# Patient Record
Sex: FEMALE | URBAN - METROPOLITAN AREA
[De-identification: names, ages, dates, MRNs, and addresses within clinical notes are randomized per-mention and may not be internally consistent; named-entity substitution may affect disease eponyms.]

---

## 2019-03-29 ENCOUNTER — APPOINTMENT (RX ONLY)
Dept: URBAN - METROPOLITAN AREA CLINIC 31 | Facility: CLINIC | Age: 69
Setting detail: DERMATOLOGY
End: 2019-03-29

## 2019-03-29 DIAGNOSIS — L81.4 OTHER MELANIN HYPERPIGMENTATION: ICD-10-CM

## 2019-03-29 DIAGNOSIS — D22 MELANOCYTIC NEVI: ICD-10-CM

## 2019-03-29 DIAGNOSIS — D18.0 HEMANGIOMA: ICD-10-CM

## 2019-03-29 DIAGNOSIS — L81.3 CAFÉ AU LAIT SPOTS: ICD-10-CM

## 2019-03-29 PROBLEM — D22.71 MELANOCYTIC NEVI OF RIGHT LOWER LIMB, INCLUDING HIP: Status: ACTIVE | Noted: 2019-03-29

## 2019-03-29 PROBLEM — D18.01 HEMANGIOMA OF SKIN AND SUBCUTANEOUS TISSUE: Status: ACTIVE | Noted: 2019-03-29

## 2019-03-29 PROBLEM — J30.1 ALLERGIC RHINITIS DUE TO POLLEN: Status: ACTIVE | Noted: 2019-03-29

## 2019-03-29 PROBLEM — D48.5 NEOPLASM OF UNCERTAIN BEHAVIOR OF SKIN: Status: ACTIVE | Noted: 2019-03-29

## 2019-03-29 PROBLEM — D22.72 MELANOCYTIC NEVI OF LEFT LOWER LIMB, INCLUDING HIP: Status: ACTIVE | Noted: 2019-03-29

## 2019-03-29 PROCEDURE — 99214 OFFICE O/P EST MOD 30 MIN: CPT | Mod: 25

## 2019-03-29 PROCEDURE — ? COUNSELING

## 2019-03-29 PROCEDURE — ? SUNSCREEN RECOMMENDATIONS

## 2019-03-29 PROCEDURE — 11102 TANGNTL BX SKIN SINGLE LES: CPT

## 2019-03-29 PROCEDURE — ? INVENTORY

## 2019-03-29 PROCEDURE — ? BIOPSY BY SHAVE METHOD

## 2019-03-29 ASSESSMENT — LOCATION ZONE DERM
LOCATION ZONE: LEG
LOCATION ZONE: ARM
LOCATION ZONE: TRUNK

## 2019-03-29 ASSESSMENT — LOCATION SIMPLE DESCRIPTION DERM
LOCATION SIMPLE: LEFT THIGH
LOCATION SIMPLE: RIGHT THIGH
LOCATION SIMPLE: LEFT UPPER ARM
LOCATION SIMPLE: RIGHT BUTTOCK
LOCATION SIMPLE: RIGHT SHOULDER

## 2019-03-29 ASSESSMENT — LOCATION DETAILED DESCRIPTION DERM
LOCATION DETAILED: RIGHT ANTERIOR SHOULDER
LOCATION DETAILED: RIGHT BUTTOCK
LOCATION DETAILED: LEFT ANTERIOR PROXIMAL UPPER ARM
LOCATION DETAILED: LEFT ANTERIOR PROXIMAL THIGH
LOCATION DETAILED: RIGHT ANTERIOR PROXIMAL THIGH

## 2019-03-29 NOTE — PROCEDURE: BIOPSY BY SHAVE METHOD
Bill For Surgical Tray: no
Notification Instructions: Patient will be notified of biopsy results. However, patient instructed to call the office if not contacted within 2 weeks.
Detail Level: Detailed
Consent: Written consent was obtained and risks were reviewed including but not limited to scarring, infection, bleeding, scabbing, incomplete removal, nerve damage and allergy to anesthesia.
Was A Bandage Applied: Yes
Lab: -20
Biopsy Method: Dermablade
Curettage Text: The wound bed was treated with curettage after the biopsy was performed.
Additional Anesthesia Volume In Cc (Will Not Render If 0): 0
Cryotherapy Text: The wound bed was treated with cryotherapy after the biopsy was performed.
Anesthesia Type: 1% lidocaine with epinephrine
Lab Facility: 3
Wound Care: Vaseline
Biopsy Type: H and E
Electrodesiccation Text: The wound bed was treated with electrodesiccation after the biopsy was performed.
Anesthesia Volume In Cc (Will Not Render If 0): 0.5
Type Of Destruction Used: Curettage
Billing Type: Third-Party Bill
Depth Of Biopsy: dermis
Post-Care Instructions: I reviewed with the patient in detail post-care instructions. Patient is to keep the biopsy site dry overnight, and then apply bacitracin twice daily until healed. Patient may apply hydrogen peroxide soaks to remove any crusting.
Electrodesiccation And Curettage Text: The wound bed was treated with electrodesiccation and curettage after the biopsy was performed.
Hemostasis: Aluminum Chloride
Silver Nitrate Text: The wound bed was treated with silver nitrate after the biopsy was performed.
Dressing: bandage

## 2019-04-24 ENCOUNTER — APPOINTMENT (RX ONLY)
Dept: URBAN - METROPOLITAN AREA CLINIC 26 | Facility: CLINIC | Age: 69
Setting detail: DERMATOLOGY
End: 2019-04-24

## 2019-04-24 VITALS — SYSTOLIC BLOOD PRESSURE: 128 MMHG | HEART RATE: 70 BPM | DIASTOLIC BLOOD PRESSURE: 67 MMHG

## 2019-04-24 PROBLEM — D03.61 MELANOMA IN SITU OF RIGHT UPPER LIMB, INCLUDING SHOULDER: Status: ACTIVE | Noted: 2019-04-24

## 2019-04-24 PROCEDURE — ? EXCISION

## 2019-04-24 PROCEDURE — 13121 CMPLX RPR S/A/L 2.6-7.5 CM: CPT

## 2019-04-24 PROCEDURE — 11602 EXC TR-EXT MAL+MARG 1.1-2 CM: CPT

## 2019-05-14 ENCOUNTER — APPOINTMENT (RX ONLY)
Dept: URBAN - METROPOLITAN AREA CLINIC 26 | Facility: CLINIC | Age: 69
Setting detail: DERMATOLOGY
End: 2019-05-14

## 2019-05-14 DIAGNOSIS — Z86.006 PERSONAL HISTORY OF MELANOMA IN-SITU: ICD-10-CM | Status: RESOLVED

## 2019-05-14 DIAGNOSIS — Z18.39 OTHER RETAINED ORGANIC FRAGMENTS: ICD-10-CM

## 2019-05-14 PROBLEM — Z85.820 PERSONAL HISTORY OF MALIGNANT MELANOMA OF SKIN: Status: ACTIVE | Noted: 2019-05-14

## 2019-05-14 PROCEDURE — 99213 OFFICE O/P EST LOW 20 MIN: CPT

## 2019-05-14 PROCEDURE — ? COUNSELING

## 2019-05-14 PROCEDURE — ? TICK REMOVAL

## 2019-05-14 PROCEDURE — ? ORDER TESTS

## 2019-05-14 ASSESSMENT — LOCATION ZONE DERM
LOCATION ZONE: ARM
LOCATION ZONE: VULVA

## 2019-05-14 ASSESSMENT — LOCATION SIMPLE DESCRIPTION DERM
LOCATION SIMPLE: GROIN
LOCATION SIMPLE: RIGHT SHOULDER

## 2019-05-14 ASSESSMENT — LOCATION DETAILED DESCRIPTION DERM
LOCATION DETAILED: RIGHT ANTERIOR SHOULDER
LOCATION DETAILED: MONS PUBIS

## 2019-05-14 NOTE — PROCEDURE: TICK REMOVAL
Foceps Text (A Tick Located On (Location)....): was removed with forceps. The tick was grasped with the forceps as close to the skin as possible and removed with gentle upward pressure.
Lidocaine Text (A Tick Located On (Location)....): was removed with lidocaine infiltration. After the lidocaine was infiltrated the tick crawled out of the skin on its own.
Detail Level: Detailed
Incision 19980 Text (A Tick Located On (Location)....): was removed utilizing an incision created by a 15-blade scalpel. This was complicated by the difficulty of visualizing and removing all of the tick parts.
Anesthesia Volume In Cc: 0.5
Curette Text (A Tick Located On (Location)....): was removed with a curette. The skin immediately surrounding the tick was removed with a curette.
Removal Method (Required For Billing): Forceps (no billing)
Incision 58146 Text (A Tick Located On (Location)....): was removed utilizing an incision created by a 15-blade scalpel.

## 2019-05-14 NOTE — HPI: SKIN LESION
What Type Of Note Output Would You Prefer (Optional)?: Bullet Format
How Severe Is Your Skin Lesion?: mild
Has Your Skin Lesion Been Treated?: not been treated
Is This A New Presentation, Or A Follow-Up?: Mole
Which Family Member (Optional)?: Cousin
Which Family Member (Optional)?: Father

## 2019-10-21 ENCOUNTER — APPOINTMENT (RX ONLY)
Dept: URBAN - METROPOLITAN AREA CLINIC 31 | Facility: CLINIC | Age: 69
Setting detail: DERMATOLOGY
End: 2019-10-21

## 2019-10-21 DIAGNOSIS — L81.4 OTHER MELANIN HYPERPIGMENTATION: ICD-10-CM

## 2019-10-21 DIAGNOSIS — D22 MELANOCYTIC NEVI: ICD-10-CM

## 2019-10-21 DIAGNOSIS — L57.0 ACTINIC KERATOSIS: ICD-10-CM

## 2019-10-21 DIAGNOSIS — Z86.006 PERSONAL HISTORY OF MELANOMA IN-SITU: ICD-10-CM | Status: RESOLVED

## 2019-10-21 DIAGNOSIS — L82.1 OTHER SEBORRHEIC KERATOSIS: ICD-10-CM

## 2019-10-21 DIAGNOSIS — L81.3 CAFÉ AU LAIT SPOTS: ICD-10-CM

## 2019-10-21 DIAGNOSIS — D18.0 HEMANGIOMA: ICD-10-CM

## 2019-10-21 PROBLEM — D18.01 HEMANGIOMA OF SKIN AND SUBCUTANEOUS TISSUE: Status: ACTIVE | Noted: 2019-10-21

## 2019-10-21 PROBLEM — D22.71 MELANOCYTIC NEVI OF RIGHT LOWER LIMB, INCLUDING HIP: Status: ACTIVE | Noted: 2019-10-21

## 2019-10-21 PROBLEM — Z85.820 PERSONAL HISTORY OF MALIGNANT MELANOMA OF SKIN: Status: ACTIVE | Noted: 2019-10-21

## 2019-10-21 PROBLEM — D22.72 MELANOCYTIC NEVI OF LEFT LOWER LIMB, INCLUDING HIP: Status: ACTIVE | Noted: 2019-10-21

## 2019-10-21 PROCEDURE — 99214 OFFICE O/P EST MOD 30 MIN: CPT | Mod: 25

## 2019-10-21 PROCEDURE — ? LIQUID NITROGEN

## 2019-10-21 PROCEDURE — ? FULL BODY SKIN EXAM

## 2019-10-21 PROCEDURE — ? SUNSCREEN RECOMMENDATIONS

## 2019-10-21 PROCEDURE — 17000 DESTRUCT PREMALG LESION: CPT

## 2019-10-21 PROCEDURE — ? COUNSELING

## 2019-10-21 ASSESSMENT — LOCATION SIMPLE DESCRIPTION DERM
LOCATION SIMPLE: RIGHT FOREHEAD
LOCATION SIMPLE: LEFT LOWER BACK
LOCATION SIMPLE: RIGHT POSTERIOR THIGH
LOCATION SIMPLE: LEFT PRETIBIAL REGION
LOCATION SIMPLE: ABDOMEN
LOCATION SIMPLE: RIGHT UPPER BACK
LOCATION SIMPLE: LEFT THIGH
LOCATION SIMPLE: RIGHT BUTTOCK
LOCATION SIMPLE: LEFT UPPER BACK

## 2019-10-21 ASSESSMENT — LOCATION DETAILED DESCRIPTION DERM
LOCATION DETAILED: LEFT SUPERIOR UPPER BACK
LOCATION DETAILED: LEFT INFERIOR MEDIAL MIDBACK
LOCATION DETAILED: LEFT PROXIMAL PRETIBIAL REGION
LOCATION DETAILED: RIGHT MEDIAL UPPER BACK
LOCATION DETAILED: LEFT LATERAL ABDOMEN
LOCATION DETAILED: RIGHT LATERAL FOREHEAD
LOCATION DETAILED: LEFT ANTERIOR PROXIMAL THIGH
LOCATION DETAILED: RIGHT DISTAL POSTERIOR THIGH
LOCATION DETAILED: RIGHT BUTTOCK
LOCATION DETAILED: LEFT RIB CAGE

## 2019-10-21 ASSESSMENT — LOCATION ZONE DERM
LOCATION ZONE: LEG
LOCATION ZONE: TRUNK
LOCATION ZONE: FACE

## 2019-10-21 NOTE — PROCEDURE: LIQUID NITROGEN
Number Of Freeze-Thaw Cycles: 2 freeze-thaw cycles
Duration Of Freeze Thaw-Cycle (Seconds): 0
Post-Care Instructions: I reviewed with the patient in detail post-care instructions. Patient is to wear sunprotection, and avoid picking at any of the treated lesions. Pt may apply Vaseline to crusted or scabbing areas.
Render Note In Bullet Format When Appropriate: No
Consent: The patient's consent was obtained including but not limited to risks of crusting, scabbing, blistering, scarring, darker or lighter pigmentary change, recurrence, incomplete removal and infection.
Detail Level: Simple

## 2019-10-21 NOTE — PROCEDURE: MIPS QUALITY
Quality 226: Preventive Care And Screening: Tobacco Use: Screening And Cessation Intervention: Patient screened for tobacco use and is an ex/non-smoker
Quality 137: Melanoma: Continuity Of Care - Recall System: Patient information entered into a recall system that includes: target date for the next exam specified AND a process to follow up with patients regarding missed or unscheduled appointments
Detail Level: Detailed
Quality 431: Preventive Care And Screening: Unhealthy Alcohol Use - Screening: Patient screened for unhealthy alcohol use using a single question and scores less than 2 times per year
Quality 130: Documentation Of Current Medications In The Medical Record: Current Medications Documented

## 2019-10-21 NOTE — HPI: EVALUATION OF SKIN LESION(S)
Hpi Title: Evaluation of Skin Lesions
How Severe Are Your Spot(S)?: mild
Have Your Spot(S) Been Treated In The Past?: has not been treated
Location: Right shoulder 3/2019

## 2020-05-12 ENCOUNTER — APPOINTMENT (RX ONLY)
Dept: URBAN - METROPOLITAN AREA CLINIC 26 | Facility: CLINIC | Age: 70
Setting detail: DERMATOLOGY
End: 2020-05-12

## 2020-05-12 DIAGNOSIS — D22 MELANOCYTIC NEVI: ICD-10-CM

## 2020-05-12 DIAGNOSIS — L82.1 OTHER SEBORRHEIC KERATOSIS: ICD-10-CM

## 2020-05-12 DIAGNOSIS — D18.0 HEMANGIOMA: ICD-10-CM

## 2020-05-12 PROBLEM — D22.71 MELANOCYTIC NEVI OF RIGHT LOWER LIMB, INCLUDING HIP: Status: ACTIVE | Noted: 2020-05-12

## 2020-05-12 PROBLEM — D18.01 HEMANGIOMA OF SKIN AND SUBCUTANEOUS TISSUE: Status: ACTIVE | Noted: 2020-05-12

## 2020-05-12 PROBLEM — D22.72 MELANOCYTIC NEVI OF LEFT LOWER LIMB, INCLUDING HIP: Status: ACTIVE | Noted: 2020-05-12

## 2020-05-12 PROCEDURE — ? COUNSELING

## 2020-05-12 PROCEDURE — 99213 OFFICE O/P EST LOW 20 MIN: CPT

## 2020-05-12 ASSESSMENT — LOCATION ZONE DERM: LOCATION ZONE: LEG

## 2020-05-12 ASSESSMENT — LOCATION DETAILED DESCRIPTION DERM
LOCATION DETAILED: LEFT DISTAL CALF
LOCATION DETAILED: LEFT DISTAL POSTERIOR THIGH
LOCATION DETAILED: LEFT POSTERIOR ANKLE
LOCATION DETAILED: LEFT ANTERIOR DISTAL THIGH
LOCATION DETAILED: RIGHT ANTERIOR DISTAL THIGH
LOCATION DETAILED: RIGHT PROXIMAL CALF
LOCATION DETAILED: RIGHT MEDIAL DISTAL PRETIBIAL REGION
LOCATION DETAILED: RIGHT ANTERIOR PROXIMAL THIGH

## 2020-05-12 ASSESSMENT — LOCATION SIMPLE DESCRIPTION DERM
LOCATION SIMPLE: LEFT ANKLE
LOCATION SIMPLE: LEFT THIGH
LOCATION SIMPLE: RIGHT THIGH
LOCATION SIMPLE: LEFT CALF
LOCATION SIMPLE: RIGHT PRETIBIAL REGION
LOCATION SIMPLE: RIGHT CALF
LOCATION SIMPLE: LEFT POSTERIOR THIGH

## 2020-06-30 ENCOUNTER — APPOINTMENT (RX ONLY)
Dept: URBAN - METROPOLITAN AREA CLINIC 26 | Facility: CLINIC | Age: 70
Setting detail: DERMATOLOGY
End: 2020-06-30

## 2020-06-30 DIAGNOSIS — D22 MELANOCYTIC NEVI: ICD-10-CM

## 2020-06-30 DIAGNOSIS — Z86.006 PERSONAL HISTORY OF MELANOMA IN-SITU: ICD-10-CM | Status: RESOLVED

## 2020-06-30 DIAGNOSIS — L81.4 OTHER MELANIN HYPERPIGMENTATION: ICD-10-CM

## 2020-06-30 DIAGNOSIS — D18.0 HEMANGIOMA: ICD-10-CM

## 2020-06-30 DIAGNOSIS — L81.3 CAFÉ AU LAIT SPOTS: ICD-10-CM

## 2020-06-30 PROBLEM — Z85.820 PERSONAL HISTORY OF MALIGNANT MELANOMA OF SKIN: Status: ACTIVE | Noted: 2020-06-30

## 2020-06-30 PROBLEM — D18.01 HEMANGIOMA OF SKIN AND SUBCUTANEOUS TISSUE: Status: ACTIVE | Noted: 2020-06-30

## 2020-06-30 PROBLEM — D22.5 MELANOCYTIC NEVI OF TRUNK: Status: ACTIVE | Noted: 2020-06-30

## 2020-06-30 PROCEDURE — ? COUNSELING

## 2020-06-30 PROCEDURE — 99213 OFFICE O/P EST LOW 20 MIN: CPT

## 2020-06-30 PROCEDURE — ? ADDITIONAL NOTES

## 2020-06-30 PROCEDURE — ? FULL BODY SKIN EXAM

## 2020-06-30 PROCEDURE — ? SUNSCREEN RECOMMENDATIONS

## 2020-06-30 ASSESSMENT — LOCATION SIMPLE DESCRIPTION DERM
LOCATION SIMPLE: LEFT UPPER BACK
LOCATION SIMPLE: RIGHT BACK
LOCATION SIMPLE: ABDOMEN
LOCATION SIMPLE: RIGHT BUTTOCK

## 2020-06-30 ASSESSMENT — LOCATION DETAILED DESCRIPTION DERM
LOCATION DETAILED: PERIUMBILICAL SKIN
LOCATION DETAILED: LEFT SUPERIOR UPPER BACK
LOCATION DETAILED: RIGHT SUPERIOR LATERAL UPPER BACK
LOCATION DETAILED: RIGHT BUTTOCK

## 2020-06-30 ASSESSMENT — LOCATION ZONE DERM: LOCATION ZONE: TRUNK

## 2020-06-30 NOTE — PROCEDURE: MIPS QUALITY
Quality 137: Melanoma: Continuity Of Care - Recall System: Patient information entered into a recall system that includes: target date for the next exam specified AND a process to follow up with patients regarding missed or unscheduled appointments
Quality 226: Preventive Care And Screening: Tobacco Use: Screening And Cessation Intervention: Patient screened for tobacco use and is an ex/non-smoker
Detail Level: Detailed
Quality 130: Documentation Of Current Medications In The Medical Record: Current Medications Documented
Quality 47: Advance Care Plan: Advance Care Planning discussed and documented; advance care plan or surrogate decision maker documented in the medical record.
Quality 431: Preventive Care And Screening: Unhealthy Alcohol Use - Screening: Patient screened for unhealthy alcohol use using a single question and scores less than 2 times per year

## 2020-06-30 NOTE — PROCEDURE: COUNSELING
Detail Level: Generalized
Detail Level: Zone
Detail Level: Detailed
Quality 137: Melanoma: Continuity Of Care - Recall System: Patient information entered into a recall system that includes: target date for the next exam specified AND a process to follow up with patients regarding missed or unscheduled appointments
Detail Level: Simple

## 2020-12-04 ENCOUNTER — APPOINTMENT (RX ONLY)
Dept: URBAN - METROPOLITAN AREA CLINIC 31 | Facility: CLINIC | Age: 70
Setting detail: DERMATOLOGY
End: 2020-12-04

## 2020-12-04 DIAGNOSIS — Z86.006 PERSONAL HISTORY OF MELANOMA IN-SITU: ICD-10-CM | Status: RESOLVED

## 2020-12-04 DIAGNOSIS — D18.0 HEMANGIOMA: ICD-10-CM

## 2020-12-04 DIAGNOSIS — L82.1 OTHER SEBORRHEIC KERATOSIS: ICD-10-CM

## 2020-12-04 DIAGNOSIS — D22 MELANOCYTIC NEVI: ICD-10-CM

## 2020-12-04 DIAGNOSIS — L81.3 CAFÉ AU LAIT SPOTS: ICD-10-CM

## 2020-12-04 DIAGNOSIS — L81.4 OTHER MELANIN HYPERPIGMENTATION: ICD-10-CM

## 2020-12-04 PROBLEM — D18.01 HEMANGIOMA OF SKIN AND SUBCUTANEOUS TISSUE: Status: ACTIVE | Noted: 2020-12-04

## 2020-12-04 PROBLEM — D22.62 MELANOCYTIC NEVI OF LEFT UPPER LIMB, INCLUDING SHOULDER: Status: ACTIVE | Noted: 2020-12-04

## 2020-12-04 PROBLEM — D22.61 MELANOCYTIC NEVI OF RIGHT UPPER LIMB, INCLUDING SHOULDER: Status: ACTIVE | Noted: 2020-12-04

## 2020-12-04 PROBLEM — Z85.820 PERSONAL HISTORY OF MALIGNANT MELANOMA OF SKIN: Status: ACTIVE | Noted: 2020-12-04

## 2020-12-04 PROCEDURE — ? COUNSELING

## 2020-12-04 PROCEDURE — 99213 OFFICE O/P EST LOW 20 MIN: CPT

## 2020-12-04 PROCEDURE — ? FULL BODY SKIN EXAM

## 2020-12-04 PROCEDURE — ? SUNSCREEN RECOMMENDATIONS

## 2020-12-04 PROCEDURE — ? ADDITIONAL NOTES

## 2020-12-04 ASSESSMENT — LOCATION ZONE DERM
LOCATION ZONE: TRUNK
LOCATION ZONE: ARM
LOCATION ZONE: LEG

## 2020-12-04 ASSESSMENT — LOCATION DETAILED DESCRIPTION DERM
LOCATION DETAILED: LEFT PROXIMAL PRETIBIAL REGION
LOCATION DETAILED: RIGHT ANTERIOR SHOULDER
LOCATION DETAILED: RIGHT BUTTOCK
LOCATION DETAILED: EPIGASTRIC SKIN
LOCATION DETAILED: LEFT SUPERIOR MEDIAL MIDBACK
LOCATION DETAILED: LEFT MID-UPPER BACK
LOCATION DETAILED: LEFT ANTERIOR SHOULDER
LOCATION DETAILED: LEFT POSTERIOR SHOULDER
LOCATION DETAILED: RIGHT ANTERIOR DISTAL THIGH
LOCATION DETAILED: RIGHT POSTERIOR SHOULDER

## 2020-12-04 ASSESSMENT — LOCATION SIMPLE DESCRIPTION DERM
LOCATION SIMPLE: ABDOMEN
LOCATION SIMPLE: RIGHT THIGH
LOCATION SIMPLE: LEFT LOWER BACK
LOCATION SIMPLE: RIGHT BUTTOCK
LOCATION SIMPLE: LEFT SHOULDER
LOCATION SIMPLE: LEFT UPPER BACK
LOCATION SIMPLE: RIGHT SHOULDER
LOCATION SIMPLE: LEFT PRETIBIAL REGION

## 2020-12-04 NOTE — HPI: MELANOMA F/U (HISTORY OF MALIGNANT MELANOMA)
What Is The Reason For Today's Visit?: Surveillance against skin cancer recurrences
Year Excised?: 3/19

## 2020-12-04 NOTE — PROCEDURE: MIPS QUALITY
Quality 137: Melanoma: Continuity Of Care - Recall System: Patient information entered into a recall system that includes: target date for the next exam specified AND a process to follow up with patients regarding missed or unscheduled appointments
Quality 431: Preventive Care And Screening: Unhealthy Alcohol Use - Screening: Patient screened for unhealthy alcohol use using a single question and scores less than 2 times per year
Detail Level: Detailed
Quality 226: Preventive Care And Screening: Tobacco Use: Screening And Cessation Intervention: Patient screened for tobacco use and is an ex/non-smoker
Quality 130: Documentation Of Current Medications In The Medical Record: Current Medications Documented

## 2020-12-04 NOTE — HPI: EVALUATION OF SKIN LESION(S)
What Type Of Note Output Would You Prefer (Optional)?: Bullet Format
Hpi Title: Evaluation of Skin Lesions
Location: Rt shoulder
Year Removed: 3/19

## 2020-12-04 NOTE — PROCEDURE: COUNSELING
Detail Level: Generalized
Detail Level: Zone
Quality 137: Melanoma: Continuity Of Care - Recall System: Patient information entered into a recall system that includes: target date for the next exam specified AND a process to follow up with patients regarding missed or unscheduled appointments
Detail Level: Detailed
Detail Level: Simple

## 2021-06-17 ENCOUNTER — APPOINTMENT (RX ONLY)
Dept: URBAN - METROPOLITAN AREA CLINIC 31 | Facility: CLINIC | Age: 71
Setting detail: DERMATOLOGY
End: 2021-06-17

## 2021-06-17 DIAGNOSIS — D22 MELANOCYTIC NEVI: ICD-10-CM

## 2021-06-17 DIAGNOSIS — L81.4 OTHER MELANIN HYPERPIGMENTATION: ICD-10-CM

## 2021-06-17 DIAGNOSIS — L82.0 INFLAMED SEBORRHEIC KERATOSIS: ICD-10-CM

## 2021-06-17 DIAGNOSIS — Z86.006 PERSONAL HISTORY OF MELANOMA IN-SITU: ICD-10-CM

## 2021-06-17 DIAGNOSIS — D18.0 HEMANGIOMA: ICD-10-CM

## 2021-06-17 DIAGNOSIS — L81.3 CAFÉ AU LAIT SPOTS: ICD-10-CM

## 2021-06-17 DIAGNOSIS — L82.1 OTHER SEBORRHEIC KERATOSIS: ICD-10-CM

## 2021-06-17 PROBLEM — D18.01 HEMANGIOMA OF SKIN AND SUBCUTANEOUS TISSUE: Status: ACTIVE | Noted: 2021-06-17

## 2021-06-17 PROBLEM — D22.9 MELANOCYTIC NEVI, UNSPECIFIED: Status: ACTIVE | Noted: 2021-06-17

## 2021-06-17 PROBLEM — Z85.820 PERSONAL HISTORY OF MALIGNANT MELANOMA OF SKIN: Status: ACTIVE | Noted: 2021-06-17

## 2021-06-17 PROCEDURE — ? TREATMENT REGIMEN

## 2021-06-17 PROCEDURE — 17110 DESTRUCTION B9 LES UP TO 14: CPT

## 2021-06-17 PROCEDURE — ? LIQUID NITROGEN

## 2021-06-17 PROCEDURE — ? FULL BODY SKIN EXAM

## 2021-06-17 PROCEDURE — ? ADDITIONAL NOTES

## 2021-06-17 PROCEDURE — 99213 OFFICE O/P EST LOW 20 MIN: CPT | Mod: 25

## 2021-06-17 PROCEDURE — ? COUNSELING

## 2021-06-17 ASSESSMENT — LOCATION DETAILED DESCRIPTION DERM
LOCATION DETAILED: LEFT SUPERIOR MEDIAL UPPER BACK
LOCATION DETAILED: PERIUMBILICAL SKIN
LOCATION DETAILED: RIGHT BUTTOCK
LOCATION DETAILED: LEFT LATERAL ABDOMEN
LOCATION DETAILED: LEFT INFERIOR UPPER BACK
LOCATION DETAILED: RIGHT LATERAL ABDOMEN
LOCATION DETAILED: EPIGASTRIC SKIN

## 2021-06-17 ASSESSMENT — LOCATION ZONE DERM: LOCATION ZONE: TRUNK

## 2021-06-17 ASSESSMENT — LOCATION SIMPLE DESCRIPTION DERM
LOCATION SIMPLE: RIGHT BUTTOCK
LOCATION SIMPLE: ABDOMEN
LOCATION SIMPLE: LEFT UPPER BACK

## 2021-06-17 NOTE — HPI: EVALUATION OF SKIN LESION(S)
Hpi Title: Evaluation of Skin Lesions
Have Your Spot(S) Been Treated In The Past?: has not been treated
Location: Right shoulder
Year Removed: 2019

## 2021-06-17 NOTE — PROCEDURE: MIPS QUALITY
Quality 226: Preventive Care And Screening: Tobacco Use: Screening And Cessation Intervention: Patient screened for tobacco use and is an ex/non-smoker
Quality 111:Pneumonia Vaccination Status For Older Adults: Pneumococcal Vaccination Previously Received
Quality 431: Preventive Care And Screening: Unhealthy Alcohol Use - Screening: Patient screened for unhealthy alcohol use using a single question and scores less than 2 times per year
Quality 137: Melanoma: Continuity Of Care - Recall System: Patient information entered into a recall system that includes: target date for the next exam specified AND a process to follow up with patients regarding missed or unscheduled appointments
Quality 110: Preventive Care And Screening: Influenza Immunization: Influenza Immunization Administered during Influenza season
Detail Level: Detailed
Quality 130: Documentation Of Current Medications In The Medical Record: Current Medications Documented

## 2021-06-17 NOTE — PROCEDURE: LIQUID NITROGEN
Post-Care Instructions: I reviewed with the patient in detail post-care instructions. Patient is to wear sunprotection, and avoid picking at any of the treated lesions. Pt may apply Vaseline to crusted or scabbing areas.
Number Of Freeze-Thaw Cycles: 3 freeze-thaw cycles
Include Z78.9 (Other Specified Conditions Influencing Health Status) As An Associated Diagnosis?: No
Medical Necessity Clause: This procedure was medically necessary because the lesions that were treated were:
Detail Level: Detailed
Consent: The patient's consent was obtained including but not limited to risks of crusting, scabbing, blistering, scarring, darker or lighter pigmentary change, recurrence, incomplete removal and infection.
Medical Necessity Information: It is in your best interest to select a reason for this procedure from the list below. All of these items fulfill various CMS LCD requirements except the new and changing color options.

## 2021-12-16 ENCOUNTER — APPOINTMENT (RX ONLY)
Dept: URBAN - METROPOLITAN AREA CLINIC 31 | Facility: CLINIC | Age: 71
Setting detail: DERMATOLOGY
End: 2021-12-16

## 2021-12-16 DIAGNOSIS — D18.0 HEMANGIOMA: ICD-10-CM

## 2021-12-16 DIAGNOSIS — L81.4 OTHER MELANIN HYPERPIGMENTATION: ICD-10-CM

## 2021-12-16 DIAGNOSIS — D22 MELANOCYTIC NEVI: ICD-10-CM

## 2021-12-16 DIAGNOSIS — L82.1 OTHER SEBORRHEIC KERATOSIS: ICD-10-CM

## 2021-12-16 DIAGNOSIS — Z86.006 PERSONAL HISTORY OF MELANOMA IN-SITU: ICD-10-CM

## 2021-12-16 DIAGNOSIS — L57.0 ACTINIC KERATOSIS: ICD-10-CM

## 2021-12-16 PROBLEM — D18.01 HEMANGIOMA OF SKIN AND SUBCUTANEOUS TISSUE: Status: ACTIVE | Noted: 2021-12-16

## 2021-12-16 PROBLEM — Z85.820 PERSONAL HISTORY OF MALIGNANT MELANOMA OF SKIN: Status: ACTIVE | Noted: 2021-12-16

## 2021-12-16 PROBLEM — D22.5 MELANOCYTIC NEVI OF TRUNK: Status: ACTIVE | Noted: 2021-12-16

## 2021-12-16 PROCEDURE — ? LIQUID NITROGEN

## 2021-12-16 PROCEDURE — 99213 OFFICE O/P EST LOW 20 MIN: CPT | Mod: 25

## 2021-12-16 PROCEDURE — ? FULL BODY SKIN EXAM

## 2021-12-16 PROCEDURE — ? ADDITIONAL NOTES

## 2021-12-16 PROCEDURE — ? COUNSELING

## 2021-12-16 PROCEDURE — 17003 DESTRUCT PREMALG LES 2-14: CPT

## 2021-12-16 PROCEDURE — 17000 DESTRUCT PREMALG LESION: CPT

## 2021-12-16 ASSESSMENT — LOCATION SIMPLE DESCRIPTION DERM
LOCATION SIMPLE: LEFT FOREHEAD
LOCATION SIMPLE: RIGHT UPPER BACK
LOCATION SIMPLE: LEFT UPPER BACK
LOCATION SIMPLE: ABDOMEN

## 2021-12-16 ASSESSMENT — LOCATION DETAILED DESCRIPTION DERM
LOCATION DETAILED: LEFT INFERIOR UPPER BACK
LOCATION DETAILED: LEFT INFERIOR FOREHEAD
LOCATION DETAILED: RIGHT MID-UPPER BACK
LOCATION DETAILED: LEFT INFERIOR LATERAL FOREHEAD
LOCATION DETAILED: LEFT LATERAL ABDOMEN
LOCATION DETAILED: LEFT SUPERIOR LATERAL UPPER BACK

## 2021-12-16 ASSESSMENT — LOCATION ZONE DERM
LOCATION ZONE: FACE
LOCATION ZONE: TRUNK

## 2021-12-16 NOTE — PROCEDURE: MIPS QUALITY
Quality 130: Documentation Of Current Medications In The Medical Record: Current Medications Documented
Quality 47: Advance Care Plan: Advance Care Planning discussed and documented; advance care plan or surrogate decision maker documented in the medical record.
Quality 226: Preventive Care And Screening: Tobacco Use: Screening And Cessation Intervention: Patient screened for tobacco use and is an ex/non-smoker
Detail Level: Detailed
Quality 137: Melanoma: Continuity Of Care - Recall System: Patient information entered into a recall system that includes: target date for the next exam specified AND a process to follow up with patients regarding missed or unscheduled appointments
Quality 431: Preventive Care And Screening: Unhealthy Alcohol Use - Screening: Patient not identified as an unhealthy alcohol user when screened for unhealthy alcohol use using a systematic screening method

## 2021-12-16 NOTE — HPI: EVALUATION OF SKIN LESION(S)
What Type Of Note Output Would You Prefer (Optional)?: Bullet Format
Hpi Title: Evaluation of Skin Lesions
Location: Rt shoulder
Year Removed: 2019

## 2022-03-17 ENCOUNTER — APPOINTMENT (RX ONLY)
Dept: URBAN - METROPOLITAN AREA CLINIC 31 | Facility: CLINIC | Age: 72
Setting detail: DERMATOLOGY
End: 2022-03-17

## 2022-03-17 DIAGNOSIS — L57.0 ACTINIC KERATOSIS: ICD-10-CM

## 2022-03-17 PROCEDURE — ? COUNSELING

## 2022-03-17 PROCEDURE — 17000 DESTRUCT PREMALG LESION: CPT

## 2022-03-17 PROCEDURE — ? ADDITIONAL NOTES

## 2022-03-17 PROCEDURE — ? LIQUID NITROGEN

## 2022-03-17 ASSESSMENT — LOCATION SIMPLE DESCRIPTION DERM: LOCATION SIMPLE: LEFT CHEEK

## 2022-03-17 ASSESSMENT — LOCATION ZONE DERM: LOCATION ZONE: FACE

## 2022-03-17 ASSESSMENT — LOCATION DETAILED DESCRIPTION DERM: LOCATION DETAILED: LEFT SUPERIOR LATERAL MALAR CHEEK

## 2022-03-17 NOTE — PROCEDURE: LIQUID NITROGEN
Show Applicator Variable?: Yes
Render Note In Bullet Format When Appropriate: No
Duration Of Freeze Thaw-Cycle (Seconds): 15
Post-Care Instructions: I reviewed with the patient in detail post-care instructions. Patient is to wear sunprotection, and avoid picking at any of the treated lesions. Pt may apply Vaseline to crusted or scabbing areas.
Number Of Freeze-Thaw Cycles: 2 freeze-thaw cycles
Detail Level: Detailed
Consent: The patient's consent was obtained including but not limited to risks of crusting, scabbing, blistering, scarring, darker or lighter pigmentary change, recurrence, incomplete removal and infection.

## 2022-06-20 ENCOUNTER — APPOINTMENT (RX ONLY)
Dept: URBAN - METROPOLITAN AREA CLINIC 31 | Facility: CLINIC | Age: 72
Setting detail: DERMATOLOGY
End: 2022-06-20

## 2022-06-20 DIAGNOSIS — L82.0 INFLAMED SEBORRHEIC KERATOSIS: ICD-10-CM

## 2022-06-20 DIAGNOSIS — D22 MELANOCYTIC NEVI: ICD-10-CM

## 2022-06-20 DIAGNOSIS — L81.4 OTHER MELANIN HYPERPIGMENTATION: ICD-10-CM

## 2022-06-20 DIAGNOSIS — D18.0 HEMANGIOMA: ICD-10-CM

## 2022-06-20 DIAGNOSIS — L57.0 ACTINIC KERATOSIS: ICD-10-CM

## 2022-06-20 DIAGNOSIS — L82.1 OTHER SEBORRHEIC KERATOSIS: ICD-10-CM

## 2022-06-20 DIAGNOSIS — Z86.006 PERSONAL HISTORY OF MELANOMA IN-SITU: ICD-10-CM

## 2022-06-20 PROBLEM — D18.01 HEMANGIOMA OF SKIN AND SUBCUTANEOUS TISSUE: Status: ACTIVE | Noted: 2022-06-20

## 2022-06-20 PROBLEM — D22.5 MELANOCYTIC NEVI OF TRUNK: Status: ACTIVE | Noted: 2022-06-20

## 2022-06-20 PROCEDURE — 99213 OFFICE O/P EST LOW 20 MIN: CPT | Mod: 25

## 2022-06-20 PROCEDURE — ? MEDICARE ABN

## 2022-06-20 PROCEDURE — ? LIQUID NITROGEN

## 2022-06-20 PROCEDURE — ? FULL BODY SKIN EXAM

## 2022-06-20 PROCEDURE — ? TREATMENT REGIMEN

## 2022-06-20 PROCEDURE — 17000 DESTRUCT PREMALG LESION: CPT | Mod: 59

## 2022-06-20 PROCEDURE — ? COUNSELING

## 2022-06-20 PROCEDURE — 17003 DESTRUCT PREMALG LES 2-14: CPT | Mod: 59

## 2022-06-20 PROCEDURE — 17110 DESTRUCTION B9 LES UP TO 14: CPT

## 2022-06-20 ASSESSMENT — LOCATION SIMPLE DESCRIPTION DERM
LOCATION SIMPLE: RIGHT EAR
LOCATION SIMPLE: UPPER BACK
LOCATION SIMPLE: CHEST
LOCATION SIMPLE: ABDOMEN

## 2022-06-20 ASSESSMENT — LOCATION ZONE DERM
LOCATION ZONE: TRUNK
LOCATION ZONE: EAR

## 2022-06-20 ASSESSMENT — LOCATION DETAILED DESCRIPTION DERM
LOCATION DETAILED: UPPER STERNUM
LOCATION DETAILED: RIGHT SUPERIOR HELIX
LOCATION DETAILED: PERIUMBILICAL SKIN
LOCATION DETAILED: INFERIOR THORACIC SPINE
LOCATION DETAILED: SUPERIOR THORACIC SPINE

## 2022-06-20 NOTE — PROCEDURE: MEDICARE ABN
Payment Option: Option 2: Don't Bill Medicare, patient responsible for payment. Patient cannot appeal Medicare if billed.
Reason?: non-covered service
Procedure (Limit To 20 Characters): liquid nitrogen
Reason?: Unwanted
Detail Level: Detailed
Cost Of Treatment Patient Responsible For Paying?: 197

## 2022-06-20 NOTE — PROCEDURE: LIQUID NITROGEN
Spray Paint Text: The liquid nitrogen was applied to the skin utilizing a spray paint frosting technique.
Spray Paint Technique: No
Medical Necessity Clause: This procedure was medically necessary because the lesions that were treated were:
Consent: The patient's consent was obtained including but not limited to risks of crusting, scabbing, blistering, scarring, darker or lighter pigmentary change, recurrence, incomplete removal and infection.
Show Topical Anesthesia Variable?: Yes
Medical Necessity Information: It is in your best interest to select a reason for this procedure from the list below. All of these items fulfill various CMS LCD requirements except the new and changing color options.
Detail Level: Detailed
Post-Care Instructions: I reviewed with the patient in detail post-care instructions. Patient is to wear sunprotection, and avoid picking at any of the treated lesions. Pt may apply Vaseline to crusted or scabbing areas.
Number Of Freeze-Thaw Cycles: 3 freeze-thaw cycles
Duration Of Freeze Thaw-Cycle (Seconds): 0
Number Of Freeze-Thaw Cycles: 2 freeze-thaw cycles
Detail Level: Simple

## 2022-06-20 NOTE — PROCEDURE: MIPS QUALITY
Quality 137: Melanoma: Continuity Of Care - Recall System: Patient information entered into a recall system that includes: target date for the next exam specified AND a process to follow up with patients regarding missed or unscheduled appointments
Quality 130: Documentation Of Current Medications In The Medical Record: Current Medications Documented
Quality 226: Preventive Care And Screening: Tobacco Use: Screening And Cessation Intervention: Patient screened for tobacco use and is an ex/non-smoker
Quality 111:Pneumonia Vaccination Status For Older Adults: Pneumococcal vaccine administered on or after patient’s 60th birthday and before the end of the measurement period
Detail Level: Detailed
Quality 47: Advance Care Plan: Advance Care Planning discussed and documented in the medical record; patient did not wish or was not able to name a surrogate decision maker or provide an advance care plan.

## 2022-06-20 NOTE — PROCEDURE: COUNSELING
Detail Level: Generalized
When Should The Patient Follow-Up For Their Next Full-Body Skin Exam?: 6 Months
Quality 137: Melanoma: Continuity Of Care - Recall System: Patient information entered into a recall system that includes: target date for the next exam specified AND a process to follow up with patients regarding missed or unscheduled appointments
Detail Level: Detailed
Detail Level: Zone
Detail Level: Simple

## 2022-08-29 NOTE — PROCEDURE: EXCISION
Graft Cartilage Fenestration Text: The cartilage was fenestrated with a 2mm punch biopsy to help facilitate graft survival and healing. Complex Repair And Xenograft Text: The defect edges were debeveled with a #15 scalpel blade.  The primary defect was closed partially with a complex linear closure.  Given the location of the defect, shape of the defect and the proximity to free margins a xenograft was deemed most appropriate to repair the remaining defect.  The graft was trimmed to fit the size of the remaining defect.  The graft was then placed in the primary defect, oriented appropriately, and sutured into place.

## 2022-11-28 ENCOUNTER — APPOINTMENT (RX ONLY)
Dept: URBAN - METROPOLITAN AREA CLINIC 31 | Facility: CLINIC | Age: 72
Setting detail: DERMATOLOGY
End: 2022-11-28

## 2022-11-28 DIAGNOSIS — L57.0 ACTINIC KERATOSIS: ICD-10-CM

## 2022-11-28 PROCEDURE — ? LIQUID NITROGEN

## 2022-11-28 PROCEDURE — 17000 DESTRUCT PREMALG LESION: CPT

## 2022-11-28 PROCEDURE — ? COUNSELING

## 2022-11-28 ASSESSMENT — LOCATION SIMPLE DESCRIPTION DERM: LOCATION SIMPLE: LEFT FOREHEAD

## 2022-11-28 ASSESSMENT — LOCATION ZONE DERM: LOCATION ZONE: FACE

## 2022-11-28 ASSESSMENT — LOCATION DETAILED DESCRIPTION DERM: LOCATION DETAILED: LEFT INFERIOR FOREHEAD

## 2022-11-28 NOTE — PROCEDURE: LIQUID NITROGEN
Render Post-Care Instructions In Note?: yes
Consent: The patient's consent was obtained including but not limited to risks of crusting, scabbing, blistering, scarring, darker or lighter pigmentary change, recurrence, incomplete removal and infection.
Render Note In Bullet Format When Appropriate: No
Post-Care Instructions: I reviewed with the patient in detail post-care instructions. Patient is to wear sunprotection, and avoid picking at any of the treated lesions. Pt may apply Vaseline to crusted or scabbing areas.
Duration Of Freeze Thaw-Cycle (Seconds): 0
Number Of Freeze-Thaw Cycles: 2 freeze-thaw cycles
Detail Level: Detailed

## 2022-12-12 NOTE — HPI: EVALUATION OF SKIN LESION(S)
What Type Of Note Output Would You Prefer (Optional)?: Bullet Format
Hpi Title: Evaluation of Skin Lesions
Location: Back
Year Removed: 2019
09-Dec-2022

## 2022-12-29 ENCOUNTER — APPOINTMENT (RX ONLY)
Dept: URBAN - METROPOLITAN AREA CLINIC 31 | Facility: CLINIC | Age: 72
Setting detail: DERMATOLOGY
End: 2022-12-29

## 2022-12-29 DIAGNOSIS — L82.1 OTHER SEBORRHEIC KERATOSIS: ICD-10-CM

## 2022-12-29 DIAGNOSIS — Z86.006 PERSONAL HISTORY OF MELANOMA IN-SITU: ICD-10-CM

## 2022-12-29 DIAGNOSIS — L57.0 ACTINIC KERATOSIS: ICD-10-CM

## 2022-12-29 DIAGNOSIS — L81.4 OTHER MELANIN HYPERPIGMENTATION: ICD-10-CM

## 2022-12-29 DIAGNOSIS — D22 MELANOCYTIC NEVI: ICD-10-CM

## 2022-12-29 DIAGNOSIS — D18.0 HEMANGIOMA: ICD-10-CM

## 2022-12-29 PROBLEM — D18.01 HEMANGIOMA OF SKIN AND SUBCUTANEOUS TISSUE: Status: ACTIVE | Noted: 2022-12-29

## 2022-12-29 PROBLEM — D22.5 MELANOCYTIC NEVI OF TRUNK: Status: ACTIVE | Noted: 2022-12-29

## 2022-12-29 PROCEDURE — 99213 OFFICE O/P EST LOW 20 MIN: CPT | Mod: 25

## 2022-12-29 PROCEDURE — ? FULL BODY SKIN EXAM

## 2022-12-29 PROCEDURE — 17000 DESTRUCT PREMALG LESION: CPT

## 2022-12-29 PROCEDURE — ? TREATMENT REGIMEN

## 2022-12-29 PROCEDURE — ? COUNSELING

## 2022-12-29 PROCEDURE — ? LIQUID NITROGEN

## 2022-12-29 PROCEDURE — 17003 DESTRUCT PREMALG LES 2-14: CPT

## 2022-12-29 ASSESSMENT — LOCATION SIMPLE DESCRIPTION DERM
LOCATION SIMPLE: RIGHT UPPER BACK
LOCATION SIMPLE: SCALP
LOCATION SIMPLE: UPPER BACK
LOCATION SIMPLE: LEFT CHEEK
LOCATION SIMPLE: CHEST

## 2022-12-29 ASSESSMENT — LOCATION DETAILED DESCRIPTION DERM
LOCATION DETAILED: LEFT SUPERIOR CENTRAL MALAR CHEEK
LOCATION DETAILED: LEFT CENTRAL FRONTAL SCALP
LOCATION DETAILED: SUPERIOR THORACIC SPINE
LOCATION DETAILED: UPPER STERNUM
LOCATION DETAILED: RIGHT SUPERIOR MEDIAL UPPER BACK

## 2022-12-29 ASSESSMENT — LOCATION ZONE DERM
LOCATION ZONE: SCALP
LOCATION ZONE: TRUNK
LOCATION ZONE: FACE

## 2022-12-29 NOTE — PROCEDURE: LIQUID NITROGEN
Show Aperture Variable?: Yes
Detail Level: Detailed
Consent: The patient's consent was obtained including but not limited to risks of crusting, scabbing, blistering, scarring, darker or lighter pigmentary change, recurrence, incomplete removal and infection.
Number Of Freeze-Thaw Cycles: 2 freeze-thaw cycles
Duration Of Freeze Thaw-Cycle (Seconds): 0
Post-Care Instructions: I reviewed with the patient in detail post-care instructions. Patient is to wear sunprotection, and avoid picking at any of the treated lesions. Pt may apply Vaseline to crusted or scabbing areas.
Render Note In Bullet Format When Appropriate: No

## 2022-12-29 NOTE — PROCEDURE: MIPS QUALITY
Quality 111:Pneumonia Vaccination Status For Older Adults: Pneumococcal vaccine (PPSV23) administered on or after patient’s 60th birthday and before the end of the measurement period
Quality 47: Advance Care Plan: Advance Care Planning discussed and documented; advance care plan or surrogate decision maker documented in the medical record.
Quality 130: Documentation Of Current Medications In The Medical Record: Current Medications Documented
Quality 137: Melanoma: Continuity Of Care - Recall System: Patient information entered into a recall system that includes: target date for the next exam specified AND a process to follow up with patients regarding missed or unscheduled appointments
Quality 226: Preventive Care And Screening: Tobacco Use: Screening And Cessation Intervention: Patient screened for tobacco use and is an ex/non-smoker
Detail Level: Detailed
Quality 431: Preventive Care And Screening: Unhealthy Alcohol Use - Screening: Patient not identified as an unhealthy alcohol user when screened for unhealthy alcohol use using a systematic screening method

## 2023-07-19 ENCOUNTER — APPOINTMENT (RX ONLY)
Dept: URBAN - METROPOLITAN AREA CLINIC 31 | Facility: CLINIC | Age: 73
Setting detail: DERMATOLOGY
End: 2023-07-19

## 2023-07-19 DIAGNOSIS — L81.4 OTHER MELANIN HYPERPIGMENTATION: ICD-10-CM

## 2023-07-19 DIAGNOSIS — D18.0 HEMANGIOMA: ICD-10-CM

## 2023-07-19 DIAGNOSIS — D22 MELANOCYTIC NEVI: ICD-10-CM

## 2023-07-19 DIAGNOSIS — Z86.006 PERSONAL HISTORY OF MELANOMA IN-SITU: ICD-10-CM

## 2023-07-19 DIAGNOSIS — L82.1 OTHER SEBORRHEIC KERATOSIS: ICD-10-CM

## 2023-07-19 PROBLEM — D22.5 MELANOCYTIC NEVI OF TRUNK: Status: ACTIVE | Noted: 2023-07-19

## 2023-07-19 PROBLEM — D18.01 HEMANGIOMA OF SKIN AND SUBCUTANEOUS TISSUE: Status: ACTIVE | Noted: 2023-07-19

## 2023-07-19 PROCEDURE — ? COUNSELING

## 2023-07-19 PROCEDURE — 99213 OFFICE O/P EST LOW 20 MIN: CPT

## 2023-07-19 PROCEDURE — ? TREATMENT REGIMEN

## 2023-07-19 PROCEDURE — ? FULL BODY SKIN EXAM

## 2023-07-19 ASSESSMENT — LOCATION ZONE DERM: LOCATION ZONE: TRUNK

## 2023-07-19 ASSESSMENT — LOCATION SIMPLE DESCRIPTION DERM
LOCATION SIMPLE: RIGHT UPPER BACK
LOCATION SIMPLE: UPPER BACK

## 2023-07-19 ASSESSMENT — LOCATION DETAILED DESCRIPTION DERM
LOCATION DETAILED: SUPERIOR THORACIC SPINE
LOCATION DETAILED: RIGHT SUPERIOR MEDIAL UPPER BACK

## 2024-01-18 LAB
ALBUMIN SERPL-MCNC: 4.1 G/DL (ref 3.5–5)
ALP SERPL-CCNC: 87 U/L (ref 38–126)
ALT SERPL-CCNC: 23 U/L (ref 0–35)
AST SERPL-CCNC: 31 U/L (ref 14–36)
BUN SERPL-MCNC: 27 MG/DL (ref 7–17)
CALCIUM SERPL-MCNC: 9.6 MG/DL (ref 8.4–10.2)
CHLORIDE SERPL-SCNC: 105 MMOL/L (ref 98–107)
CO2 SERPL-SCNC: 27 MMOL/L (ref 22–30)
EGFR: 42.62
ERYTHROCYTE [DISTWIDTH] IN BLOOD BY AUTOMATED COUNT: 12.5 % (ref 11.5–14.5)
GLUCOSE SERPL-MCNC: 97 MG/DL (ref 70–99)
HBA1C MFR BLD: 5.4 % (ref 4–5.6)
HCT VFR BLD AUTO: 41 % (ref 37–47)
HGB BLD-MCNC: 14.3 G/DL (ref 12–16)
MCHC RBC AUTO-ENTMCNC: 34.9 G/DL (ref 33–37)
MCV RBC AUTO: 104.3 FL (ref 81–99)
PLATELET # BLD AUTO: 186 10^3/UL (ref 130–400)
POTASSIUM SERPL-SCNC: 4.6 MMOL/L (ref 3.5–5.1)
PROT SERPL-MCNC: 6.5 G/DL (ref 6.3–8.2)
SODIUM SERPL-SCNC: 138 MMOL/L (ref 135–145)

## 2024-02-05 ENCOUNTER — HOSPITAL ENCOUNTER (OUTPATIENT)
Dept: HOSPITAL 99 - SDS | Age: 74
Discharge: HOME HEALTH | End: 2024-02-05
Payer: MEDICARE

## 2024-02-05 VITALS — SYSTOLIC BLOOD PRESSURE: 133 MMHG | DIASTOLIC BLOOD PRESSURE: 76 MMHG | RESPIRATION RATE: 18 BRPM

## 2024-02-05 VITALS — RESPIRATION RATE: 17 BRPM | SYSTOLIC BLOOD PRESSURE: 118 MMHG | DIASTOLIC BLOOD PRESSURE: 73 MMHG

## 2024-02-05 VITALS — SYSTOLIC BLOOD PRESSURE: 122 MMHG | DIASTOLIC BLOOD PRESSURE: 68 MMHG | RESPIRATION RATE: 15 BRPM

## 2024-02-05 VITALS — RESPIRATION RATE: 14 BRPM | SYSTOLIC BLOOD PRESSURE: 122 MMHG | DIASTOLIC BLOOD PRESSURE: 69 MMHG

## 2024-02-05 VITALS — OXYGEN SATURATION: 95 % | SYSTOLIC BLOOD PRESSURE: 130 MMHG | DIASTOLIC BLOOD PRESSURE: 80 MMHG | HEART RATE: 75 BPM

## 2024-02-05 VITALS — RESPIRATION RATE: 16 BRPM | DIASTOLIC BLOOD PRESSURE: 80 MMHG

## 2024-02-05 VITALS — DIASTOLIC BLOOD PRESSURE: 70 MMHG | SYSTOLIC BLOOD PRESSURE: 131 MMHG

## 2024-02-05 VITALS — OXYGEN SATURATION: 2 % | DIASTOLIC BLOOD PRESSURE: 56 MMHG

## 2024-02-05 VITALS — RESPIRATION RATE: 16 BRPM | DIASTOLIC BLOOD PRESSURE: 62 MMHG

## 2024-02-05 VITALS — SYSTOLIC BLOOD PRESSURE: 98 MMHG | DIASTOLIC BLOOD PRESSURE: 64 MMHG | RESPIRATION RATE: 12 BRPM

## 2024-02-05 VITALS — DIASTOLIC BLOOD PRESSURE: 69 MMHG | SYSTOLIC BLOOD PRESSURE: 126 MMHG | RESPIRATION RATE: 13 BRPM

## 2024-02-05 VITALS — OXYGEN SATURATION: 2 % | DIASTOLIC BLOOD PRESSURE: 64 MMHG | SYSTOLIC BLOOD PRESSURE: 98 MMHG

## 2024-02-05 VITALS — OXYGEN SATURATION: 2 %

## 2024-02-05 VITALS — DIASTOLIC BLOOD PRESSURE: 75 MMHG

## 2024-02-05 VITALS — RESPIRATION RATE: 20 BRPM | SYSTOLIC BLOOD PRESSURE: 135 MMHG | DIASTOLIC BLOOD PRESSURE: 81 MMHG

## 2024-02-05 VITALS — DIASTOLIC BLOOD PRESSURE: 62 MMHG | SYSTOLIC BLOOD PRESSURE: 122 MMHG | RESPIRATION RATE: 17 BRPM

## 2024-02-05 VITALS — BODY MASS INDEX: 25.9 KG/M2

## 2024-02-05 VITALS — RESPIRATION RATE: 25 BRPM

## 2024-02-05 DIAGNOSIS — M17.12: Primary | ICD-10-CM

## 2024-02-05 DIAGNOSIS — N18.30: ICD-10-CM

## 2024-02-05 PROCEDURE — 27447 TOTAL KNEE ARTHROPLASTY: CPT

## 2024-02-05 PROCEDURE — C1776 JOINT DEVICE (IMPLANTABLE): HCPCS

## 2024-02-05 PROCEDURE — C1713 ANCHOR/SCREW BN/BN,TIS/BN: HCPCS

## 2024-02-05 RX ADMIN — CELECOXIB 200 MG: 200 CAPSULE ORAL at 07:52

## 2024-02-05 RX ADMIN — ACETAMINOPHEN 650 MG: 325 TABLET ORAL at 07:52

## 2024-02-05 RX ADMIN — CEFAZOLIN 5: 10 INJECTION, POWDER, FOR SOLUTION INTRAVENOUS at 13:24

## 2024-02-05 RX ADMIN — SODIUM CHLORIDE, SODIUM ACETATE ANHYDROUS, SODIUM GLUCONATE, POTASSIUM CHLORIDE, AND MAGNESIUM CHLORIDE 1000: 526; 222; 502; 37; 30 INJECTION, SOLUTION INTRAVENOUS at 07:52

## 2024-04-01 ENCOUNTER — APPOINTMENT (RX ONLY)
Dept: URBAN - METROPOLITAN AREA CLINIC 31 | Facility: CLINIC | Age: 74
Setting detail: DERMATOLOGY
End: 2024-04-01

## 2024-04-01 DIAGNOSIS — L82.1 OTHER SEBORRHEIC KERATOSIS: ICD-10-CM

## 2024-04-01 DIAGNOSIS — L81.4 OTHER MELANIN HYPERPIGMENTATION: ICD-10-CM

## 2024-04-01 DIAGNOSIS — D18.0 HEMANGIOMA: ICD-10-CM

## 2024-04-01 DIAGNOSIS — Z86.006 PERSONAL HISTORY OF MELANOMA IN-SITU: ICD-10-CM

## 2024-04-01 DIAGNOSIS — L57.0 ACTINIC KERATOSIS: ICD-10-CM

## 2024-04-01 DIAGNOSIS — D22 MELANOCYTIC NEVI: ICD-10-CM

## 2024-04-01 PROBLEM — D22.5 MELANOCYTIC NEVI OF TRUNK: Status: ACTIVE | Noted: 2024-04-01

## 2024-04-01 PROBLEM — D18.01 HEMANGIOMA OF SKIN AND SUBCUTANEOUS TISSUE: Status: ACTIVE | Noted: 2024-04-01

## 2024-04-01 PROCEDURE — ? TREATMENT REGIMEN

## 2024-04-01 PROCEDURE — 17003 DESTRUCT PREMALG LES 2-14: CPT

## 2024-04-01 PROCEDURE — ? FULL BODY SKIN EXAM

## 2024-04-01 PROCEDURE — 99213 OFFICE O/P EST LOW 20 MIN: CPT | Mod: 25

## 2024-04-01 PROCEDURE — ? LIQUID NITROGEN

## 2024-04-01 PROCEDURE — ? COUNSELING

## 2024-04-01 PROCEDURE — 17000 DESTRUCT PREMALG LESION: CPT

## 2024-04-01 ASSESSMENT — LOCATION DETAILED DESCRIPTION DERM
LOCATION DETAILED: LEFT SUPERIOR PARIETAL SCALP
LOCATION DETAILED: LEFT FOREHEAD
LOCATION DETAILED: RIGHT SUPERIOR MEDIAL UPPER BACK
LOCATION DETAILED: SUPERIOR THORACIC SPINE

## 2024-04-01 ASSESSMENT — LOCATION SIMPLE DESCRIPTION DERM
LOCATION SIMPLE: UPPER BACK
LOCATION SIMPLE: LEFT FOREHEAD
LOCATION SIMPLE: RIGHT UPPER BACK
LOCATION SIMPLE: SCALP

## 2024-04-01 ASSESSMENT — LOCATION ZONE DERM
LOCATION ZONE: FACE
LOCATION ZONE: SCALP
LOCATION ZONE: TRUNK

## 2024-04-01 NOTE — PROCEDURE: FULL BODY SKIN EXAM
Price (Do Not Change): 0.00
Detail Level: Simple
Instructions: This plan will send the code FBSE to the PM system.  DO NOT or CHANGE the price.
6

## 2024-04-01 NOTE — PROCEDURE: LIQUID NITROGEN
Detail Level: Detailed
Consent: The patient's consent was obtained including but not limited to risks of crusting, scabbing, blistering, scarring, darker or lighter pigmentary change, recurrence, incomplete removal and infection.
Duration Of Freeze Thaw-Cycle (Seconds): 3
Show Applicator Variable?: Yes
Render Note In Bullet Format When Appropriate: No
Post-Care Instructions: I reviewed with the patient in detail post-care instructions. Patient is to wear sunprotection, and avoid picking at any of the treated lesions. Pt may apply Vaseline to crusted or scabbing areas.
Number Of Freeze-Thaw Cycles: 2 freeze-thaw cycles

## 2024-10-21 ENCOUNTER — APPOINTMENT (RX ONLY)
Dept: URBAN - METROPOLITAN AREA CLINIC 31 | Facility: CLINIC | Age: 74
Setting detail: DERMATOLOGY
End: 2024-10-21

## 2024-10-21 DIAGNOSIS — L82.0 INFLAMED SEBORRHEIC KERATOSIS: ICD-10-CM

## 2024-10-21 DIAGNOSIS — L81.4 OTHER MELANIN HYPERPIGMENTATION: ICD-10-CM

## 2024-10-21 DIAGNOSIS — D18.0 HEMANGIOMA: ICD-10-CM

## 2024-10-21 DIAGNOSIS — D22 MELANOCYTIC NEVI: ICD-10-CM

## 2024-10-21 DIAGNOSIS — Z86.006 PERSONAL HISTORY OF MELANOMA IN-SITU: ICD-10-CM

## 2024-10-21 DIAGNOSIS — L82.1 OTHER SEBORRHEIC KERATOSIS: ICD-10-CM

## 2024-10-21 PROBLEM — D18.01 HEMANGIOMA OF SKIN AND SUBCUTANEOUS TISSUE: Status: ACTIVE | Noted: 2024-10-21

## 2024-10-21 PROBLEM — D48.5 NEOPLASM OF UNCERTAIN BEHAVIOR OF SKIN: Status: ACTIVE | Noted: 2024-10-21

## 2024-10-21 PROBLEM — D22.5 MELANOCYTIC NEVI OF TRUNK: Status: ACTIVE | Noted: 2024-10-21

## 2024-10-21 PROCEDURE — 11102 TANGNTL BX SKIN SINGLE LES: CPT | Mod: 59

## 2024-10-21 PROCEDURE — 99213 OFFICE O/P EST LOW 20 MIN: CPT | Mod: 25

## 2024-10-21 PROCEDURE — ? BIOPSY BY SHAVE METHOD

## 2024-10-21 PROCEDURE — ? COUNSELING

## 2024-10-21 PROCEDURE — ? MEDICARE ABN

## 2024-10-21 PROCEDURE — ? TREATMENT REGIMEN

## 2024-10-21 PROCEDURE — 11103 TANGNTL BX SKIN EA SEP/ADDL: CPT | Mod: 59

## 2024-10-21 PROCEDURE — ? LIQUID NITROGEN

## 2024-10-21 PROCEDURE — 17110 DESTRUCTION B9 LES UP TO 14: CPT

## 2024-10-21 PROCEDURE — ? FULL BODY SKIN EXAM

## 2024-10-21 ASSESSMENT — LOCATION ZONE DERM
LOCATION ZONE: LEG
LOCATION ZONE: TRUNK
LOCATION ZONE: FACE

## 2024-10-21 ASSESSMENT — LOCATION SIMPLE DESCRIPTION DERM
LOCATION SIMPLE: LEFT EYEBROW
LOCATION SIMPLE: UPPER BACK
LOCATION SIMPLE: RIGHT BREAST
LOCATION SIMPLE: LEFT LOWER LEG
LOCATION SIMPLE: RIGHT UPPER BACK
LOCATION SIMPLE: LEFT PRETIBIAL REGION

## 2024-10-21 ASSESSMENT — LOCATION DETAILED DESCRIPTION DERM
LOCATION DETAILED: RIGHT LATERAL BREAST 10-11:00 REGION
LOCATION DETAILED: LEFT LATERAL EYEBROW
LOCATION DETAILED: LEFT PROXIMAL LATERAL PRETIBIAL REGION
LOCATION DETAILED: LEFT LATERAL DISTAL PRETIBIAL REGION
LOCATION DETAILED: SUPERIOR THORACIC SPINE
LOCATION DETAILED: RIGHT SUPERIOR UPPER BACK

## 2024-10-21 NOTE — PROCEDURE: MEDICARE ABN
Detail Level: Detailed
Procedure (Limit To 20 Characters): liquid nitrogen
Reason?: Inflamed, painful, itchy
Cost Of Treatment Patient Responsible For Paying?: 197
Reason?: non-covered service
Payment Option: Option 1: Bill Medicare, await for decision on payment.

## 2024-10-21 NOTE — PROCEDURE: LIQUID NITROGEN
Include Z78.9 (Other Specified Conditions Influencing Health Status) As An Associated Diagnosis?: No
Post-Care Instructions: I reviewed with the patient in detail post-care instructions. Patient is to wear sunprotection, and avoid picking at any of the treated lesions. Pt may apply Vaseline to crusted or scabbing areas.
Show Applicator Variable?: Yes
Consent: The patient's consent was obtained including but not limited to risks of crusting, scabbing, blistering, scarring, darker or lighter pigmentary change, recurrence, incomplete removal and infection.
Medical Necessity Clause: This procedure was medically necessary because the lesions that were treated were:
Number Of Freeze-Thaw Cycles: 3 freeze-thaw cycles
Detail Level: Detailed
Medical Necessity Information: It is in your best interest to select a reason for this procedure from the list below. All of these items fulfill various CMS LCD requirements except the new and changing color options.
Spray Paint Text: The liquid nitrogen was applied to the skin utilizing a spray paint frosting technique.

## 2025-04-22 ENCOUNTER — APPOINTMENT (OUTPATIENT)
Dept: URBAN - METROPOLITAN AREA CLINIC 31 | Facility: CLINIC | Age: 75
Setting detail: DERMATOLOGY
End: 2025-04-22

## 2025-04-22 DIAGNOSIS — L57.0 ACTINIC KERATOSIS: ICD-10-CM

## 2025-04-22 DIAGNOSIS — D18.0 HEMANGIOMA: ICD-10-CM

## 2025-04-22 DIAGNOSIS — L82.1 OTHER SEBORRHEIC KERATOSIS: ICD-10-CM

## 2025-04-22 DIAGNOSIS — L81.4 OTHER MELANIN HYPERPIGMENTATION: ICD-10-CM

## 2025-04-22 DIAGNOSIS — D22 MELANOCYTIC NEVI: ICD-10-CM

## 2025-04-22 DIAGNOSIS — Z86.006 PERSONAL HISTORY OF MELANOMA IN-SITU: ICD-10-CM

## 2025-04-22 PROBLEM — D22.5 MELANOCYTIC NEVI OF TRUNK: Status: ACTIVE | Noted: 2025-04-22

## 2025-04-22 PROBLEM — D18.01 HEMANGIOMA OF SKIN AND SUBCUTANEOUS TISSUE: Status: ACTIVE | Noted: 2025-04-22

## 2025-04-22 PROCEDURE — ? LIQUID NITROGEN

## 2025-04-22 PROCEDURE — 99213 OFFICE O/P EST LOW 20 MIN: CPT | Mod: 25

## 2025-04-22 PROCEDURE — 17000 DESTRUCT PREMALG LESION: CPT

## 2025-04-22 PROCEDURE — ? COUNSELING

## 2025-04-22 PROCEDURE — ? TREATMENT REGIMEN

## 2025-04-22 PROCEDURE — ? FULL BODY SKIN EXAM

## 2025-04-22 ASSESSMENT — LOCATION DETAILED DESCRIPTION DERM
LOCATION DETAILED: RIGHT NASAL DORSUM
LOCATION DETAILED: SUPERIOR THORACIC SPINE

## 2025-04-22 ASSESSMENT — LOCATION ZONE DERM
LOCATION ZONE: NOSE
LOCATION ZONE: TRUNK

## 2025-04-22 ASSESSMENT — LOCATION SIMPLE DESCRIPTION DERM
LOCATION SIMPLE: UPPER BACK
LOCATION SIMPLE: NOSE

## 2025-04-22 NOTE — PROCEDURE: TREATMENT REGIMEN
Detail Level: Simple
Otc Regimen: I recommended an over the counter broad spectrum sunscreen with a SPF of 30 or higher such as Cerave or LaRoche. Sunscreens should be applied every 2 hours as long as sun exposure continues.
English

## 2025-04-22 NOTE — PROCEDURE: LIQUID NITROGEN
Render Post-Care Instructions In Note?: yes
Consent: The patient's consent was obtained including but not limited to risks of crusting, scabbing, blistering, scarring, darker or lighter pigmentary change, recurrence, incomplete removal and infection.
Detail Level: Detailed
Render Note In Bullet Format When Appropriate: No
Duration Of Freeze Thaw-Cycle (Seconds): 3
Post-Care Instructions: I reviewed with the patient in detail post-care instructions. Patient is to wear sunprotection, and avoid picking at any of the treated lesions. Pt may apply Vaseline to crusted or scabbing areas.
Number Of Freeze-Thaw Cycles: 2 freeze-thaw cycles